# Patient Record
Sex: MALE | Race: WHITE | URBAN - METROPOLITAN AREA
[De-identification: names, ages, dates, MRNs, and addresses within clinical notes are randomized per-mention and may not be internally consistent; named-entity substitution may affect disease eponyms.]

---

## 2022-03-19 PROBLEM — E10.649 TYPE 1 DIABETES MELLITUS WITH HYPOGLYCEMIA AND WITHOUT COMA (HCC): Status: ACTIVE | Noted: 2019-12-20

## 2022-05-24 ENCOUNTER — TELEPHONE (OUTPATIENT)
Dept: ENDOCRINOLOGY | Age: 42
End: 2022-05-24

## 2022-05-24 NOTE — TELEPHONE ENCOUNTER
Patient called and stated that he would like to get the new omnipod 5. Patient states that his insurance will cover it he just needs a prior authorization. Left patient a message to call back.

## 2022-05-25 ENCOUNTER — TELEPHONE (OUTPATIENT)
Dept: ENDOCRINOLOGY | Age: 42
End: 2022-05-25

## 2022-05-25 DIAGNOSIS — E10.649 TYPE 1 DIABETES MELLITUS WITH HYPOGLYCEMIA AND WITHOUT COMA (HCC): Primary | ICD-10-CM

## 2022-05-25 RX ORDER — INSULIN PMP CART,AUT,G6/7,CNTR
EACH SUBCUTANEOUS
Qty: 1 KIT | Refills: 0 | Status: SHIPPED | OUTPATIENT
Start: 2022-05-25

## 2022-05-25 RX ORDER — INSULIN PMP CART,AUT,G6/7,CNTR
EACH SUBCUTANEOUS
Qty: 30 EACH | Refills: 3 | Status: SHIPPED | OUTPATIENT
Start: 2022-05-25

## 2022-05-25 NOTE — TELEPHONE ENCOUNTER
Patient called and stated that he would like to change his Omnipod to the 5. Patient has contacted his insurance plan and he states that it is covered. Olivia uses [facility]. Patient stated that the pharmacy does have this available.  Patient would like kit and pods sent to [facility] Rx Statement Selected

## 2022-06-03 ENCOUNTER — TELEPHONE (OUTPATIENT)
Dept: ENDOCRINOLOGY | Age: 42
End: 2022-06-03

## 2022-07-01 ENCOUNTER — TELEPHONE (OUTPATIENT)
Dept: ENDOCRINOLOGY | Age: 42
End: 2022-07-01

## 2022-07-01 NOTE — TELEPHONE ENCOUNTER
Pt LVM stating he called his insurance and asked about his Omnipod PA and if it has been approved or denied. Pt stated his insurance advised him they do not have a PA on file for him. Pt is requesting a call back to discuss.

## 2022-08-16 ENCOUNTER — OFFICE VISIT (OUTPATIENT)
Dept: ENDOCRINOLOGY | Age: 42
End: 2022-08-16
Payer: COMMERCIAL

## 2022-08-16 VITALS
HEIGHT: 74 IN | WEIGHT: 170 LBS | OXYGEN SATURATION: 98 % | BODY MASS INDEX: 21.82 KG/M2 | HEART RATE: 69 BPM | SYSTOLIC BLOOD PRESSURE: 122 MMHG | DIASTOLIC BLOOD PRESSURE: 64 MMHG

## 2022-08-16 DIAGNOSIS — E10.649 TYPE 1 DIABETES MELLITUS WITH HYPOGLYCEMIA AND WITHOUT COMA (HCC): Primary | ICD-10-CM

## 2022-08-16 DIAGNOSIS — Z96.41 INSULIN PUMP STATUS: ICD-10-CM

## 2022-08-16 LAB — HBA1C MFR BLD: 5.9 %

## 2022-08-16 PROCEDURE — 95251 CONT GLUC MNTR ANALYSIS I&R: CPT | Performed by: PHYSICIAN ASSISTANT

## 2022-08-16 PROCEDURE — 99214 OFFICE O/P EST MOD 30 MIN: CPT | Performed by: PHYSICIAN ASSISTANT

## 2022-08-16 PROCEDURE — 83036 HEMOGLOBIN GLYCOSYLATED A1C: CPT | Performed by: PHYSICIAN ASSISTANT

## 2022-08-16 RX ORDER — INSULIN ASPART 100 [IU]/ML
INJECTION, SOLUTION INTRAVENOUS; SUBCUTANEOUS
COMMUNITY

## 2022-08-16 RX ORDER — ESOMEPRAZOLE MAGNESIUM 40 MG/1
CAPSULE, DELAYED RELEASE ORAL
COMMUNITY
Start: 2022-07-30

## 2022-08-16 RX ORDER — BLOOD-GLUCOSE TRANSMITTER
EACH MISCELLANEOUS
Qty: 1 EACH | Refills: 3
Start: 2022-08-16

## 2022-08-16 RX ORDER — BLOOD-GLUCOSE SENSOR
EACH MISCELLANEOUS
Qty: 9 EACH | Refills: 3
Start: 2022-08-16

## 2022-08-16 RX ORDER — INSULIN GLARGINE 300 U/ML
INJECTION, SOLUTION SUBCUTANEOUS
COMMUNITY

## 2022-08-16 RX ORDER — METOCLOPRAMIDE 10 MG/1
TABLET ORAL
COMMUNITY
Start: 2022-07-12

## 2022-08-16 RX ORDER — FAMOTIDINE 40 MG/1
TABLET, FILM COATED ORAL
COMMUNITY
Start: 2022-05-25

## 2022-08-16 NOTE — Clinical Note
Can you guys help me to remember to get this download when available and make setting change recommendations?  THANKS!!!

## 2022-08-16 NOTE — PROGRESS NOTES
[facility]   [facility]   [facility]ODULE CLINIC    Julia Marrero PA-C  [facility] Endocrinology and Thyroid Nodule Clinic  [address], [address] [facility],[address]  [place]  Phone [phone]  Facsimile [phone]          Denisha Lopez is a 38 y.o. male seen 8/16/2022 for follow up evaluation of type 1 diabetes        Assessment and Plan:    In office COVID-19 PPE worn and precautions taken    Interpretation of 72 hour glucose monitor: At least 72 hours of data were reviewed. The patient utilizes a dexcom G6 continuous glucose monitoring system. The average glucose during the reviewed timeframe was 142 with a standard deviation of 42. There is a pattern of late evening hyperglycemia. 1. Type 1 diabetes mellitus with hypoglycemia and without coma [facility])  Patient with type 1 diabetes returns to clinic for follow-up. Unfortunately we are unable to view his pump data. No significant hypoglycemia appreciated by review of Dexcom G6. Patient reports hypoglycemia frequently appreciated in the late evening, patient has made some setting changes to his pump. Notes slightly worse glycemic control but less hypoglycemia on the OmniPod 5 system. Patient was connected to our account today and we will review his numbers later in the week when they are available to see if he would benefit from any setting changes. Of note patient had a small skin disruption in the distal tip of his left hallux, possible splinter versus superficial laceration. Home foot care including soaking, washing with soap and water, and patting dry watching for any signs of infection reviewed.   Patient have a low threshold to seek evaluation for this wound  - AMB POC HEMOGLOBIN A1C  - TOUJEO SOLOSTAR 300 UNIT/ML SOPN; Inject into the skin IN CASE OF PUMP FAILURE INJECT 15 units SQ q 24 hours  - NOVOLOG 100 UNIT/ML injection vial; Inject into the skin Use with insulin pump max daily dose 50 units  - Continuous Blood Gluc Sensor (DEXCOM G6 SENSOR) MISC; Use to monitor glucose, E10.65  Dispense: 9 each; Refill: 3  - Continuous Blood Gluc Transmit (DEXCOM G6 TRANSMITTER) MISC; Use to monitor glucose, E10.65  Dispense: 1 each; Refill: 3  - CT CONTINUOUS GLUCOSE MONITORING ANALYSIS I&R    2. Insulin pump status  Changes today, patient connected to our system, data should be available in 24 hours  - Insulin Infusion Pump PJ; Pump settings:     standard pattern- 24 hour total 15 units   Time  0:00  0.9, 3a 0.85 10a 0.9 2pm 0.6  BASAL LIGHT MN 0.8, noon 0.5    Carb Ratio 0:00  10, 5 pm 15  Insulin Sensitivity 55  Target low  110  Target high 140  Active Insulin time 3:00  Max Bolus 10 units  Dispense: 1 each; Refill: 0          Olena Green was seen today for diabetes. Diagnoses and all orders for this visit:    Type 1 diabetes mellitus with hypoglycemia and without coma ([facility] [address]  -     AMB POC HEMOGLOBIN A1C  -     Continuous Blood Gluc Sensor (DEXCOM G6 SENSOR) MISC; Use to monitor glucose, E10.65  -     Continuous Blood Gluc Transmit (DEXCOM G6 TRANSMITTER) MISC; Use to monitor glucose, E10.65  -     CT CONTINUOUS GLUCOSE MONITORING ANALYSIS I&R    Insulin pump status  -     Insulin Infusion Pump PJ; Pump settings:     standard pattern- 24 hour total 15 units   Time  0:00  0.9, 3a 0.85 10a 0.9 2pm 0.6  BASAL LIGHT MN 0.8, noon 0.5    Carb Ratio 0:00  10, 5 pm 15  Insulin Sensitivity 55  Target low  110  Target high 140  Active Insulin time 3:00  Max Bolus 10 units          History of Present Illness:    8/16/2022  Now on omipod 5, doing well, notes some higher readings after, worse late evenings, slow to correct           4/15/2022   VIRTUAL VISIT   Angella Christensen is a 38 y.o.  male who was seen by synchronous (real-time) audio-video technology on 4/15/2022 . The  patient provided consent for this audio-video interaction. The [employer] platform was used. Patient was located at their home and provider in the office.       To not seen in [facility] than 1 year, meets for virtual follow-up. Reports he is doing well overall. Is experiencing some hypoglycemia typically associated with activity. 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 Has made setting changes. Had full physical with primary care last fall           1/22/2021   VIRTUAL VISIT   Paz Dillon is a 47 y.o. male who was seen by synchronous (real-time) audio-video technology on 1/22/2021 . The patient provided consent for this audio-video interaction. The [employer] platform was used. Patient was located at their home and provider  in the office. parked car       Needs for virtual follow-up with recent approval initiation of therapy with Omni pod insulin pump. Review of current treatment regimen shows patient is taking 15 units of Toujeo daily and using a 1-11 carb ratio in the morning and a 1-15 carb ratio  in the evenings in addition to adding 1 unit if blood glucose is greater than 150 for correction. Fairly stable glycemic control. 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 is here for follow-up of type 1 diabetes mellitus. 5/28/2020   VIRTUAL VISIT   Paz Dillon is a 47 y.o. male who was seen by synchronous (real-time) audio-video technology on 5/28/2020 . The patient provided consent for this audio-video interaction. The [employer] platform was used. Patient was located at in his car and provider at home. 8/28/2020   VIRTUAL VISIT   Paz Dillon is a 47 y.o. male who was seen by synchronous (real-time) audio-video technology on 8/28/2020 . The patient provided consent for this audio-video interaction. The [employer] platform was used. Patient was located at parked car and provider at home.         10/20/2020   Pt with CELENA on basal bolus insulin regimen with use of CGM, interested in pump therapy RTC for f/u. Interested in [facility] pod pump therapy he is unable to obtain because he has  not completed diabetes education. It should be noted that he is the  of the medical group and has access to medical support throughout his workday. He has been working with both       Pt works at [employer], work with [facility] [employer] and [facility]                    Date of diagnosis: 12/16/2019       Diet: no particular diet       Exercise: Active lifestyle / no exercise regimen       Body weight trend: unintentionally lost ~15 pounds over 3-4 months; regaining on insulin       Diabetes education: The patient has not received formal diabetes education. Diabetic complications:                     Retinopathy : Last eye examination late summer 2021. demonstrated no retinopathy                      Albuminuria/nephropathy :                                                      08/27/2020      Cr 0.9, GFR 94, microalbumin/Cr ratio 8.6                       Neuropathy :  No suggestive symptoms        Insulin Pump:    Omnipod 5      NO DATA AVAILABLE 8/16/2022    Home blood glucose monitoring frequency:   By review of CGM download over past 30 days  Average blood glucose 142 ± 42  Time in range 83.0%  High 15.6%, Very High 2.3%  Low 1.4%, Very Low 0.2%     Typical Standard Deviation   Fasting 129 18   AC lunch 126 30   AC supper 127 40   Bedtime 155 55     Blood glucose levels are uncontrolled, most significant elevations are at bedtime         Hypoglycemia: none       Hemoglobin A1c:   12/16/2019: 10.9%. 2/24/2020: 6.0%.   08/27/2020: 5.4%   09/27/2021: 5.0%  08/16/2022: 5.9%       Other pertinent labs:               Lipids :                           12/16/2019:  Total cholesterol 135, triglycerides 47, HDL 58, LDL 67.                           08/27/2020  TC- 155, LDL- 88, VLDL- /,  HDL- 58, TG- 37                       Thyroid :

## 2023-01-21 DIAGNOSIS — E10.649 TYPE 1 DIABETES MELLITUS WITH HYPOGLYCEMIA AND WITHOUT COMA (HCC): ICD-10-CM

## 2023-01-23 RX ORDER — PROCHLORPERAZINE 25 MG/1
SUPPOSITORY RECTAL
Qty: 9 EACH | Refills: 3 | OUTPATIENT
Start: 2023-01-23

## 2023-02-22 DIAGNOSIS — E10.649 TYPE 1 DIABETES MELLITUS WITH HYPOGLYCEMIA AND WITHOUT COMA (HCC): ICD-10-CM

## 2023-02-22 RX ORDER — BLOOD-GLUCOSE SENSOR
EACH MISCELLANEOUS
Qty: 9 EACH | Refills: 3 | Status: SHIPPED | OUTPATIENT
Start: 2023-02-22

## 2023-02-22 RX ORDER — BLOOD-GLUCOSE TRANSMITTER
EACH MISCELLANEOUS
Qty: 1 EACH | Refills: 3 | Status: SHIPPED | OUTPATIENT
Start: 2023-02-22

## 2023-02-22 RX ORDER — INSULIN PMP CART,AUT,G6/7,CNTR
EACH SUBCUTANEOUS
Qty: 30 EACH | Refills: 3 | Status: SHIPPED | OUTPATIENT
Start: 2023-02-22

## 2023-02-22 RX ORDER — INSULIN PMP CART,AUT,G6/7,CNTR
EACH SUBCUTANEOUS
Qty: 1 KIT | Refills: 0 | Status: SHIPPED | OUTPATIENT
Start: 2023-02-22

## 2023-02-22 NOTE — TELEPHONE ENCOUNTER
Pt LVM requesting refill for Dexcom sen+trans + [facility]od [facility] purposes.

## 2023-03-14 DIAGNOSIS — E10.649 TYPE 1 DIABETES MELLITUS WITH HYPOGLYCEMIA AND WITHOUT COMA (HCC): ICD-10-CM

## 2023-03-14 RX ORDER — INSULIN PMP CART,AUT,G6/7,CNTR
EACH SUBCUTANEOUS
Qty: 30 EACH | Refills: 3 | Status: SHIPPED | OUTPATIENT
Start: 2023-03-14

## 2023-03-14 RX ORDER — BLOOD-GLUCOSE SENSOR
EACH MISCELLANEOUS
Qty: 9 EACH | Refills: 3 | Status: SHIPPED | OUTPATIENT
Start: 2023-03-14

## 2023-03-14 NOTE — TELEPHONE ENCOUNTER
Rochelle response:        dexcom 7 does not integrate with the omnipod 5 at this time.  I refilled your omnipod 5 and dexcom G6 sensors at this time    ~Mandy

## 2023-03-16 DIAGNOSIS — E10.649 TYPE 1 DIABETES MELLITUS WITH HYPOGLYCEMIA AND WITHOUT COMA (HCC): ICD-10-CM

## 2023-03-16 RX ORDER — INSULIN ASPART 100 [IU]/ML
INJECTION, SOLUTION INTRAVENOUS; SUBCUTANEOUS
Qty: 50 ML | Refills: 3 | Status: CANCELLED | OUTPATIENT
Start: 2023-03-16

## 2023-03-16 NOTE — TELEPHONE ENCOUNTER
Pt would his short acting insulin. Our records shows he uses Novolog but I think [facility] prefers Humalog.

## 2023-10-10 ENCOUNTER — PATIENT MESSAGE (OUTPATIENT)
Dept: ENDOCRINOLOGY | Age: 43
End: 2023-10-10

## 2023-10-10 NOTE — TELEPHONE ENCOUNTER
From: Liza Oconnor  To: Kimberly Rodrigues  Sent: 10/10/2023 12:30 PM EDT  Subject: Emergency glucose pen refill    Natalya Tello - I noticed the other day the emergency glucagon pens I have are . Could you order two through [facility] [address] [place] [address] for me? No huge rush. Thank you so much.

## 2023-11-09 NOTE — TELEPHONE ENCOUNTER
Pt LVM requesting refill of Omnnipod 5 + dexcom sensors sent [facility]. Patient is seeking the Dexcom G7 to be sent as well. Epidermal Sutures: none, closed by secondary intention

## 2024-01-11 DIAGNOSIS — E10.649 TYPE 1 DIABETES MELLITUS WITH HYPOGLYCEMIA AND WITHOUT COMA (HCC): ICD-10-CM

## 2024-01-11 DIAGNOSIS — Z96.41 INSULIN PUMP STATUS: ICD-10-CM

## 2024-01-11 NOTE — TELEPHONE ENCOUNTER
Not seen since aug of 2022 and at that visit his pump was not connected to our office    Pt needs to set up a follow up appt and ensure we can see his omnipod 5 data before I can safely send refills

## 2024-01-11 NOTE — TELEPHONE ENCOUNTER
Pt change insurance company and has to use different pharmacy. Please [facility].    Patient also wants Humalog but it won't let me pull it over.

## 2024-01-12 RX ORDER — PROCHLORPERAZINE 25 MG/1
SUPPOSITORY RECTAL
Qty: 1 EACH | Refills: 3 | Status: SHIPPED | OUTPATIENT
Start: 2024-01-12

## 2024-01-12 RX ORDER — INSULIN PMP CART,AUT,G6/7,CNTR
EACH SUBCUTANEOUS
Qty: 30 EACH | Refills: 3 | Status: SHIPPED | OUTPATIENT
Start: 2024-01-12

## 2024-01-12 RX ORDER — INSULIN PMP CART,AUT,G6/7,CNTR
EACH SUBCUTANEOUS
Qty: 1 KIT | Refills: 0 | OUTPATIENT
Start: 2024-01-12

## 2024-01-12 RX ORDER — PROCHLORPERAZINE 25 MG/1
SUPPOSITORY RECTAL
Qty: 9 EACH | Refills: 3 | Status: SHIPPED | OUTPATIENT
Start: 2024-01-12

## 2024-01-30 ENCOUNTER — TELEPHONE (OUTPATIENT)
Dept: ENDOCRINOLOGY | Age: 44
End: 2024-01-30

## 2024-02-08 NOTE — TELEPHONE ENCOUNTER
Pt called and needs a PA for his Dexcom transmitter, he is going out of town need week and needs them before leaving.

## 2024-02-09 ENCOUNTER — TELEPHONE (OUTPATIENT)
Dept: ENDOCRINOLOGY | Age: 44
End: 2024-02-09

## 2024-02-09 NOTE — TELEPHONE ENCOUNTER
PA approved   Omnipod Pods    Approved  PA Detail   Prior authorization approved Case ID: [id number]      [facility]    [phone]    [phone]   Request Reference Number: [id number]. OMNIPOD 5 G6 MIS PODS is approved through 02/09/2025. Your patient may now fill this prescription and it will be covered.   Approval Details    Authorized from February 9, 2024 to February 9, 2025      Electronic appeal: Not supported   View History

## 2024-02-09 NOTE — TELEPHONE ENCOUNTER
Sensors    Approved  PA Detail   Prior authorization approved Case ID: [id number]      Payer: [facility]    [phone]    [phone]   Request Reference Number: [id number]. DEXCOM G6 MIS SENSOR is approved through 02/09/2025. Your patient may now fill this prescription and it will be covered.   Approval Details    Authorized from February 9, 2024 to February 9, 2025      Electronic appeal: Not supported   View History

## 2024-02-09 NOTE — TELEPHONE ENCOUNTER
Approved   2/9/2024  1:05 PM  Case ID: [id number] Appeal supported: No  Note from payer: Request Reference Number: [id number]. OMNIPOD 5 G6 MIS PODS is approved through 02/09/2025. Your patient may now fill this prescription and it will be covered.   Payer: [facility]    [phone]    [phone]     Notes     Time User Attachment    Attachment received from payer. 2/9/2024  1:05 PM Estela Lilly Outgoing Prescription Prior Authorization Response

## 2024-02-09 NOTE — TELEPHONE ENCOUNTER
Approved   2/9/2024  1:08 PM  Case ID: [id number] Appeal supported: No  Note from payer: Request Reference Number: [id number]. DEXCOM G6 MIS SENSOR is approved through 02/09/2025. Your patient may now fill this prescription and it will be covered.   Payer: [facility]    [phone]    [phone]     Notes     Time User Attachment    Attachment received from payer. 2/9/2024  1:08 PM Estela Lilly Outgoing Prescription Prior Authorization Response Document

## 2024-02-19 RX ORDER — INSULIN LISPRO 100 [IU]/ML
INJECTION, SOLUTION INTRAVENOUS; SUBCUTANEOUS
Qty: 50 ML | Refills: 3 | OUTPATIENT
Start: 2024-02-19

## 2024-02-21 ENCOUNTER — OFFICE VISIT (OUTPATIENT)
Dept: ENDOCRINOLOGY | Age: 44
End: 2024-02-21
Payer: COMMERCIAL

## 2024-02-21 VITALS
HEIGHT: 74 IN | DIASTOLIC BLOOD PRESSURE: 68 MMHG | WEIGHT: 181 LBS | HEART RATE: 74 BPM | RESPIRATION RATE: 16 BRPM | OXYGEN SATURATION: 98 % | BODY MASS INDEX: 23.23 KG/M2 | SYSTOLIC BLOOD PRESSURE: 106 MMHG

## 2024-02-21 DIAGNOSIS — Z96.41 INSULIN PUMP STATUS: ICD-10-CM

## 2024-02-21 DIAGNOSIS — E10.649 TYPE 1 DIABETES MELLITUS WITH HYPOGLYCEMIA AND WITHOUT COMA (HCC): Primary | ICD-10-CM

## 2024-02-21 LAB — HBA1C MFR BLD: 5.8 %

## 2024-02-21 PROCEDURE — 83036 HEMOGLOBIN GLYCOSYLATED A1C: CPT | Performed by: PHYSICIAN ASSISTANT

## 2024-02-21 PROCEDURE — 99214 OFFICE O/P EST MOD 30 MIN: CPT | Performed by: PHYSICIAN ASSISTANT

## 2024-02-21 PROCEDURE — 95251 CONT GLUC MNTR ANALYSIS I&R: CPT | Performed by: PHYSICIAN ASSISTANT

## 2024-02-21 RX ORDER — INSULIN LISPRO 100 [IU]/ML
INJECTION, SOLUTION INTRAVENOUS; SUBCUTANEOUS
Qty: 50 ML | Refills: 3 | Status: SHIPPED | OUTPATIENT
Start: 2024-02-21

## 2024-02-21 NOTE — PROGRESS NOTES
retinopathy     Obesity:         Body mass index is 23.24 kg/m².       stable    (Increased steadily since diagnosis)  Wt Readings from Last 3 Encounters:   02/21/24 82.1 kg (181 lb)   08/16/22 77.1 kg (170 lb)         CardioVascular:    None     Renal:    Under care of nephro? no    On ARB/ACE-I  This patient does not have an active medication from one of the medication groupers.       NOT On Kerendia.    08/27/2020      Cr 0.9, GFR 94, microalbumin/Cr ratio 8.6         Lipids:     Current therapy This patient does not have an active medication from one of the medication groupers.     12/16/2019: Total cholesterol 135, triglycerides 47, HDL 58, LDL 67.                           08/27/2020  TC- 155, LDL- 88, VLDL- /,  HDL- 58, TG- 37          No results found for: \"CHOL\"  No results found for: \"LDLCHOLESTEROL\", \"LDLCALC\" No results found for: \"VLDL\" No results found for: \"HDL\" No results found for: \"HDL\" No results found for: \"TRIG\"  No results found for: \"CHOLHDLRATIO\"      Hemoglobin A1c:   12/16/2019: 10.9%.   2/24/2020: 6.0%.   08/27/2020: 5.4%   09/27/2021: 5.0%  08/16/2022: 5.9%  2/21/24 5.8%    Hemoglobin A1C, POC   Date Value Ref Range Status   02/21/2024 5.8 % Final   08/16/2022 5.9 % Final   02/24/2020 6.0 % Final        Thyroid:                                12/16/2019: TSH 1.24, free T4 1.21.                           08/27/2020: TSH 0.78, Free T4 1.39    No results found for: \"TSH\", \"TSH2\", \"TSH3\"        C-peptide :                           12/16/2019: 1.0 (glucose 272).                       08/27/2020               PRICE-65           COMMENT               IA-2 ab             >120       Reviewed and updated this visit by provider:  Tobacco  Allergies  Meds  Problems  Med Hx  Surg Hx  Fam Hx                  Allergies & Medications:  Reviewed in chart.        Review of Systems    Vital Signs:  /68 (Site: Left Upper Arm, Position: Sitting, Cuff Size: Medium Adult)   Pulse 74   Resp 16

## 2024-03-05 ENCOUNTER — PATIENT MESSAGE (OUTPATIENT)
Dept: ENDOCRINOLOGY | Age: 44
End: 2024-03-05

## 2024-03-05 RX ORDER — INSULIN ASPART 100 [IU]/ML
INJECTION, SOLUTION INTRAVENOUS; SUBCUTANEOUS
Qty: 50 ML | Refills: 3 | Status: SHIPPED | OUTPATIENT
Start: 2024-03-05

## 2024-03-05 NOTE — TELEPHONE ENCOUNTER
myChart response:    It seems novolog is on formulary so I sent that to [facility]    I do not have your labs. It is rare we can see labs that were drawn at [facility]. Please have the staff send them over for our review. Fax [phone]   ~Mandy

## 2024-03-05 NOTE — TELEPHONE ENCOUNTER
From: Bill Evans  To: Mandy Hoyos  Sent: 3/5/2024 1:31 PM EST  Subject: Meds and Labs    Marvin Galvan - two quick things. I think the insulin refill you called in for me needs a prior auth for [facility]. Also, I had my labs drawn at [facility] a week and a half ago, so hopefully you’ve received those?

## 2024-08-21 ENCOUNTER — OFFICE VISIT (OUTPATIENT)
Dept: ENDOCRINOLOGY | Age: 44
End: 2024-08-21
Payer: COMMERCIAL

## 2024-08-21 VITALS
HEIGHT: 74 IN | DIASTOLIC BLOOD PRESSURE: 72 MMHG | HEART RATE: 68 BPM | OXYGEN SATURATION: 98 % | WEIGHT: 188.2 LBS | BODY MASS INDEX: 24.15 KG/M2 | SYSTOLIC BLOOD PRESSURE: 122 MMHG

## 2024-08-21 DIAGNOSIS — Z96.41 INSULIN PUMP STATUS: ICD-10-CM

## 2024-08-21 DIAGNOSIS — E10.649 TYPE 1 DIABETES MELLITUS WITH HYPOGLYCEMIA AND WITHOUT COMA (HCC): Primary | ICD-10-CM

## 2024-08-21 LAB — HBA1C MFR BLD: 6.2 %

## 2024-08-21 PROCEDURE — 99214 OFFICE O/P EST MOD 30 MIN: CPT | Performed by: PHYSICIAN ASSISTANT

## 2024-08-21 PROCEDURE — 95251 CONT GLUC MNTR ANALYSIS I&R: CPT | Performed by: PHYSICIAN ASSISTANT

## 2024-08-21 PROCEDURE — 83036 HEMOGLOBIN GLYCOSYLATED A1C: CPT | Performed by: PHYSICIAN ASSISTANT

## 2024-08-21 RX ORDER — OMEPRAZOLE 40 MG/1
40 CAPSULE, DELAYED RELEASE ORAL DAILY
COMMUNITY
Start: 2024-08-06

## 2024-08-21 RX ORDER — INSULIN ASPART INJECTION 100 [IU]/ML
INJECTION, SOLUTION SUBCUTANEOUS
Qty: 50 ML | Refills: 3 | Status: SHIPPED | OUTPATIENT
Start: 2024-08-21

## 2024-08-21 NOTE — PROGRESS NOTES
AMB POC HEMOGLOBIN A1C  -     FIASP 100 UNIT/ML SOLN; Use with insulin pump, max daily dose 50 units  -     GLUCOSE MONITOR, 72 HOUR, PHYS INTERP  -      DIABETES FOOT EXAM    Insulin pump status  -     Insulin Infusion Pump PJ; Pump settings:   Omnipod 5  standard pattern- 24 hour total 15 units   Time  0:00  1.1, 3a 0.85 10a 0.9 2pm 0.6, 830pm 1.1  Carb Ratio 0:00  8, 2:30 pm 10  Insulin Sensitivity 35  Target low  110  Target high 110  Active Insulin time 2:30  Max Bolus 12 units            History of Present Illness:    8/21/2024      Type I DIABETES MELLITUS    Bill Evans is here for follow up evaluation and treatment of stable type 1 diabetes mellitus.          Date of diagnosis: 12/16/2019       History obtained from patient      Portions of this note were generated with the assistance of voice recogniton software.  As such, some errors in transcription may be present.  8/21/2024   Interim diabetes HPI:      Pt RTC doing well  Not entering ghost carbs  Is using correction but often advised no insulin  Working to bolus before meals, accurately counting carbs        Lab Results   Component Value Date    LABA1C 5.4 08/27/2020     No results found for: \"GLUF\", \"MALBCR\", \"CREATININE\"       Current Regimen: humalog    Glucose data:    Insulin Pump:    Omnipod 5      TDD 43.4 units      Basal   22.5 units, 52%    Bolus   20.8 units, 48%    Home blood glucose monitoring frequency:   By review of CGM download over past 30 days  Average blood glucose 147 ± 48  Time in range 77%  High 17%, Very High 4%  Low 2%, Very Low 0%      Blood glucose levels are stable  Diabetes education: The patient has received formal diabetes education.      Failed past therapies:     Relevant co morbidities:    Denies  HX pancreatitis, DKA, gastroparesis, foot ulcer    Optho:  Last eye examination late summer 2023. [facility]  demonstrated no retinopathy     Obesity:         Body mass index is 24.16 kg/m².       stable    (Increased

## 2024-11-14 DIAGNOSIS — E10.649 TYPE 1 DIABETES MELLITUS WITH HYPOGLYCEMIA AND WITHOUT COMA (HCC): ICD-10-CM

## 2024-11-15 RX ORDER — PROCHLORPERAZINE 25 MG/1
SUPPOSITORY RECTAL
Qty: 9 EACH | Refills: 3 | Status: SHIPPED | OUTPATIENT
Start: 2024-11-15

## 2025-01-16 DIAGNOSIS — E10.649 TYPE 1 DIABETES MELLITUS WITH HYPOGLYCEMIA AND WITHOUT COMA (HCC): ICD-10-CM

## 2025-01-17 DIAGNOSIS — E10.649 TYPE 1 DIABETES MELLITUS WITH HYPOGLYCEMIA AND WITHOUT COMA (HCC): ICD-10-CM

## 2025-01-17 RX ORDER — PROCHLORPERAZINE 25 MG/1
SUPPOSITORY RECTAL
Qty: 1 EACH | Refills: 3 | Status: SHIPPED | OUTPATIENT
Start: 2025-01-17

## 2025-01-17 RX ORDER — INSULIN PMP CART,AUT,G6/7,CNTR
EACH SUBCUTANEOUS
Qty: 30 EACH | Refills: 3 | Status: SHIPPED | OUTPATIENT
Start: 2025-01-17

## 2025-02-21 ENCOUNTER — OFFICE VISIT (OUTPATIENT)
Dept: ENDOCRINOLOGY | Age: 45
End: 2025-02-21

## 2025-02-21 ENCOUNTER — PATIENT MESSAGE (OUTPATIENT)
Dept: ENDOCRINOLOGY | Age: 45
End: 2025-02-21

## 2025-02-21 VITALS
BODY MASS INDEX: 24.69 KG/M2 | HEIGHT: 74 IN | WEIGHT: 192.4 LBS | OXYGEN SATURATION: 96 % | HEART RATE: 73 BPM | DIASTOLIC BLOOD PRESSURE: 80 MMHG | RESPIRATION RATE: 16 BRPM | SYSTOLIC BLOOD PRESSURE: 130 MMHG

## 2025-02-21 DIAGNOSIS — Z96.41 INSULIN PUMP STATUS: ICD-10-CM

## 2025-02-21 DIAGNOSIS — E10.65 TYPE 1 DIABETES MELLITUS WITH HYPERGLYCEMIA (HCC): Primary | ICD-10-CM

## 2025-02-21 DIAGNOSIS — E10.649 TYPE 1 DIABETES MELLITUS WITH HYPOGLYCEMIA AND WITHOUT COMA (HCC): Primary | ICD-10-CM

## 2025-02-21 LAB — HBA1C MFR BLD: 6 %

## 2025-02-21 RX ORDER — INSULIN PMP CART,AUT,G6/7,CNTR
EACH SUBCUTANEOUS
Qty: 5 EACH | Refills: 3 | Status: SHIPPED | OUTPATIENT
Start: 2025-02-21

## 2025-02-21 RX ORDER — INSULIN ASPART INJECTION 100 [IU]/ML
INJECTION, SOLUTION SUBCUTANEOUS
Qty: 50 ML | Refills: 3 | Status: SHIPPED | OUTPATIENT
Start: 2025-02-21

## 2025-02-21 RX ORDER — INSULIN LISPRO 100 [IU]/ML
INJECTION, SOLUTION INTRAVENOUS; SUBCUTANEOUS
Qty: 10 ML | Refills: 1 | Status: SHIPPED | OUTPATIENT
Start: 2025-02-21

## 2025-02-21 RX ORDER — INSULIN ASPART INJECTION 100 [IU]/ML
INJECTION, SOLUTION SUBCUTANEOUS
Qty: 10 ML | Refills: 1 | Status: SHIPPED | OUTPATIENT
Start: 2025-02-21

## 2025-02-21 NOTE — PROGRESS NOTES
[facility] ENDOCRINOLOGY   AND   THYROID NODULE CLINIC    Mandy Hoyos PA-C  [facility] Endocrinology and Thyroid Nodule Clinic  [address], [address]  [place] [address]  Phone [phone]  Facsimile [phone]          Bill Evans is a 45 y.o. male seen 2/21/2025 for follow up evaluation of type 1 diabetes on insulin pump        Assessment and Plan:    Interpretation of 72 hour glucose monitor:  At least 72 hours of data were reviewed.  The patient utilizes a Dexcom G6 continuous glucose monitoring system.  The average glucose during the reviewed timeframe was 143 with a standard deviation of 45.  There is a pattern of intermittent post prandial hyperglycemia after meals    Assessment & Plan    1. Type 1 diabetes mellitus with hypoglycemia and without coma (HCC)  Stable. A1c improved from 6.2 to 6, indicating effective management. Using G6 system, will update to G7 upon release. Occasional hypoglycemic episodes due to over-bolusing. Informed that Fiasp can clog tubing, especially with tube pumps. Has 6 mm tube and cannula.  - Continue diligent bolusing at meal onset and effective correction bolus.  - Monitor blood glucose closely during stress and illness.  - Prioritize health, rest, and exercise.  - Continue current foot care regimen.  - Swab tubing if blood glucose elevated despite bolusing.  - Schedule overdue eye exam.  - Inform healthcare professionals of autoimmune insulin-dependent type 1 diabetes on a pump.  - Inform children and adult siblings about small risk of type 1 diabetes, consider antibody testing.  - Contact if experiencing hypoglycemic episodes or difficulties managing blood glucose.  - Use G6 pods first.  - Change pump site if consistently needing early changes.  - Ensure prescription for long-acting insulin in case of pump failure.  - Reverse correction feature deactivated, daytime settings streamlined.  - Prescription for insulin provided.  -continue using more

## 2025-02-21 NOTE — TELEPHONE ENCOUNTER
Spoke to pt, sent in 1 box of PODs and 1 vial of BOTH Fiasp and humalog, aware to obtain what is covered

## 2025-02-21 NOTE — TELEPHONE ENCOUNTER
I called the patient to see what he needed and he said there is a [facility] next to his hotel   Departments: [facility] · [facility]  Address: [address], [place] [address] [phone]  He normally uses [facility] and not sure if they will transfer the prescription. He is calling them now to see what they can do  I explained that my providers and staff are gone now   I did tell him since the phones are transferred I will call him back in 15 minutes.

## 2025-02-24 ENCOUNTER — TELEPHONE (OUTPATIENT)
Dept: ENDOCRINOLOGY | Age: 45
End: 2025-02-24

## 2025-02-24 NOTE — TELEPHONE ENCOUNTER
The patient called over the weekend on 2025.  He was out of town in [place] and states that he forgot his insulin pump supplies.  His OmniPod pod .  I called in a prescription for Toujeo, pen needles and insulin syringes and gave him instructions on management until he could resume his insulin pump therapy.

## 2025-04-21 ENCOUNTER — TELEPHONE (OUTPATIENT)
Dept: ENDOCRINOLOGY | Age: 45
End: 2025-04-21

## 2025-04-21 DIAGNOSIS — E10.65 TYPE 1 DIABETES MELLITUS WITH HYPERGLYCEMIA (HCC): ICD-10-CM

## 2025-04-21 DIAGNOSIS — E10.649 TYPE 1 DIABETES MELLITUS WITH HYPOGLYCEMIA AND WITHOUT COMA (HCC): ICD-10-CM

## 2025-04-21 RX ORDER — INSULIN PMP CART,AUT,G6/7,CNTR
EACH SUBCUTANEOUS
Qty: 5 EACH | Refills: 3 | Status: CANCELLED | OUTPATIENT
Start: 2025-04-21

## 2025-04-22 RX ORDER — INSULIN PMP CART,AUT,G6/7,CNTR
EACH SUBCUTANEOUS
Qty: 30 EACH | Refills: 3 | Status: SHIPPED | OUTPATIENT
Start: 2025-04-22

## 2025-04-22 NOTE — TELEPHONE ENCOUNTER
Outcome  Approved today by Juan R 2017 [facility]  Request Reference Number: [id number]. OMNIPOD 5 DX MIS POD G7G6 is approved through 04/22/2026. Your patient may now fill this prescription and it will be covered.  Effective Date: 4/22/2025  Authorization Expiration Date: 4/22/2026

## 2025-08-07 ENCOUNTER — OFFICE VISIT (OUTPATIENT)
Dept: ENDOCRINOLOGY | Age: 45
End: 2025-08-07

## 2025-08-07 VITALS
BODY MASS INDEX: 23.97 KG/M2 | HEART RATE: 61 BPM | HEIGHT: 74 IN | WEIGHT: 186.8 LBS | OXYGEN SATURATION: 97 % | SYSTOLIC BLOOD PRESSURE: 120 MMHG | DIASTOLIC BLOOD PRESSURE: 82 MMHG

## 2025-08-07 DIAGNOSIS — E10.649 TYPE 1 DIABETES MELLITUS WITH HYPOGLYCEMIA AND WITHOUT COMA (HCC): Primary | ICD-10-CM

## 2025-08-07 DIAGNOSIS — Z96.41 INSULIN PUMP STATUS: ICD-10-CM

## 2025-08-07 LAB — HBA1C MFR BLD: 5.9 %

## 2025-08-07 RX ORDER — INSULIN PMP CART,AUT,G6/7,CNTR
EACH SUBCUTANEOUS
Qty: 30 EACH | Refills: 3 | Status: SHIPPED | OUTPATIENT
Start: 2025-08-07

## 2025-08-07 RX ORDER — ACYCLOVIR 400 MG/1
TABLET ORAL
Qty: 9 EACH | Refills: 3 | Status: SHIPPED | OUTPATIENT
Start: 2025-08-07

## 2025-08-07 RX ORDER — INSULIN ASPART INJECTION 100 [IU]/ML
INJECTION, SOLUTION SUBCUTANEOUS
Qty: 15 ML | Refills: 1 | Status: SHIPPED | OUTPATIENT
Start: 2025-08-07

## 2025-08-07 RX ORDER — PEN NEEDLE, DIABETIC 32GX 5/32"
NEEDLE, DISPOSABLE MISCELLANEOUS
Qty: 150 EACH | Refills: 1 | Status: SHIPPED | OUTPATIENT
Start: 2025-08-07

## 2025-08-07 RX ORDER — INSULIN GLARGINE 300 U/ML
INJECTION, SOLUTION SUBCUTANEOUS
Qty: 15 ML | Refills: 1 | Status: SHIPPED | OUTPATIENT
Start: 2025-08-07

## 2025-08-07 RX ORDER — INSULIN ASPART INJECTION 100 [IU]/ML
INJECTION, SOLUTION SUBCUTANEOUS
Qty: 50 ML | Refills: 3 | Status: SHIPPED | OUTPATIENT
Start: 2025-08-07